# Patient Record
Sex: MALE | Race: WHITE | Employment: OTHER | URBAN - METROPOLITAN AREA
[De-identification: names, ages, dates, MRNs, and addresses within clinical notes are randomized per-mention and may not be internally consistent; named-entity substitution may affect disease eponyms.]

---

## 2018-12-07 ENCOUNTER — HOSPITAL ENCOUNTER (OUTPATIENT)
Dept: MRI IMAGING | Age: 63
Discharge: HOME | End: 2018-12-07
Attending: SPECIALIST
Payer: COMMERCIAL

## 2018-12-07 DIAGNOSIS — M54.12 CERVICAL RADICULOPATHY: ICD-10-CM

## 2018-12-07 PROCEDURE — 72141 MRI NECK SPINE W/O DYE: CPT

## 2018-12-18 ENCOUNTER — TELEPHONE (OUTPATIENT)
Dept: NEUROLOGY | Age: 63
End: 2018-12-18

## 2018-12-18 ENCOUNTER — DOCUMENTATION ONLY (OUTPATIENT)
Dept: NEUROLOGY | Age: 63
End: 2018-12-18

## 2018-12-18 NOTE — TELEPHONE ENCOUNTER
Patient gave me his previous MD info, Dr. Antoinette Lau, [facility] and Dr. Shalonda Orr, with [facility]. Requested records.

## 2018-12-18 NOTE — TELEPHONE ENCOUNTER
----- Message from [employer] sent at 12/18/2018 11:38 AM EST -----  Regarding: Dr. Reddy Mccarty  The patient missed a couple of calls from the office and is requesting a call back.  (m)[phone]

## 2020-06-10 ENCOUNTER — OFFICE VISIT (OUTPATIENT)
Dept: ENDOCRINOLOGY | Age: 65
End: 2020-06-10

## 2020-06-10 VITALS
HEIGHT: 70 IN | BODY MASS INDEX: 30.48 KG/M2 | WEIGHT: 212.9 LBS | DIASTOLIC BLOOD PRESSURE: 84 MMHG | TEMPERATURE: 99.5 F | OXYGEN SATURATION: 98 % | SYSTOLIC BLOOD PRESSURE: 130 MMHG | HEART RATE: 83 BPM | RESPIRATION RATE: 18 BRPM

## 2020-06-10 DIAGNOSIS — M54.12 CERVICAL RADICULOPATHY: Primary | ICD-10-CM

## 2020-06-10 DIAGNOSIS — E11.65 TYPE 2 DIABETES MELLITUS WITH HYPERGLYCEMIA, UNSPECIFIED WHETHER LONG TERM INSULIN USE (HCC): ICD-10-CM

## 2020-06-10 DIAGNOSIS — E11.65 TYPE 2 DIABETES MELLITUS WITH HYPERGLYCEMIA, WITH LONG-TERM CURRENT USE OF INSULIN (HCC): Primary | ICD-10-CM

## 2020-06-10 DIAGNOSIS — Z79.4 TYPE 2 DIABETES MELLITUS WITH HYPERGLYCEMIA, WITH LONG-TERM CURRENT USE OF INSULIN (HCC): Primary | ICD-10-CM

## 2020-06-10 DIAGNOSIS — I10 ESSENTIAL HYPERTENSION: ICD-10-CM

## 2020-06-10 DIAGNOSIS — E78.2 MIXED HYPERLIPIDEMIA: ICD-10-CM

## 2020-06-10 RX ORDER — BISMUTH SUBSALICYLATE 262 MG
1 TABLET,CHEWABLE ORAL DAILY
COMMUNITY
End: 2021-01-12

## 2020-06-10 RX ORDER — ASPIRIN 81 MG/1
81 TABLET ORAL DAILY
COMMUNITY

## 2020-06-10 RX ORDER — INSULIN GLARGINE 300 U/ML
100 INJECTION, SOLUTION SUBCUTANEOUS DAILY
COMMUNITY
End: 2020-07-21 | Stop reason: SDUPTHER

## 2020-06-10 RX ORDER — FLASH GLUCOSE SCANNING READER
EACH MISCELLANEOUS
Qty: 1 EACH | Refills: 0 | Status: SHIPPED | OUTPATIENT
Start: 2020-06-10 | End: 2020-08-05

## 2020-06-10 RX ORDER — FLASH GLUCOSE SENSOR
KIT MISCELLANEOUS
Qty: 6 KIT | Refills: 4 | Status: SHIPPED | OUTPATIENT
Start: 2020-06-10 | End: 2020-08-05

## 2020-06-10 RX ORDER — EVOLOCUMAB 140 MG/ML
140 INJECTION, SOLUTION SUBCUTANEOUS
COMMUNITY
End: 2020-08-13

## 2020-06-10 RX ORDER — DULAGLUTIDE 0.75 MG/.5ML
0.75 INJECTION, SOLUTION SUBCUTANEOUS
Qty: 4 SYRINGE | Refills: 11 | Status: SHIPPED | OUTPATIENT
Start: 2020-06-10 | End: 2021-04-01

## 2020-06-10 RX ORDER — ESCITALOPRAM OXALATE 20 MG/1
20 TABLET ORAL DAILY
COMMUNITY
End: 2020-09-21 | Stop reason: SDUPTHER

## 2020-06-10 RX ORDER — ICOSAPENT ETHYL 1000 MG/1
2 CAPSULE ORAL 2 TIMES DAILY WITH MEALS
COMMUNITY
End: 2020-09-29

## 2020-06-10 NOTE — PROGRESS NOTES
Theo Ascencio MD          Patient Information  Date:6/15/2020  Name : Jimmy Milton 62 y.o.     YOB: 1955         Referred by: Romy Martinez MD         Chief Complaint   Patient presents with    New Patient     referred for DM       History of Present Illness: Jimmy Milton is a 62 y.o. male here for initial visit of  Type 2 Diabetes Mellitus. Type 2 diabetes mellitus was diagnosed in 2010. He is on metformin, Toujeo 100 units at bedtime, was on Humalog before. It was discontinued when he started on Toujeo according to the patient. He was on SGLT2 inhibitors but discontinued due to polyuria. A1c is high, has polyuria, polydipsia  He has tried to eat healthy,  can stick to it for 1 week and after that he goes back to his regular eating habits. \"I have heard about the diet changes 1000 times but I cannot stick to it \"  limited activity, increase hunger  He started checking the blood glucose recently, they are ranging from 200-300  No hypoglycemia  Weight has been fluctuating  No nausea, vomiting, abdominal pain  No chest pain or shortness of breath        Wt Readings from Last 3 Encounters:   06/10/20 212 lb 14.4 oz (96.6 kg)   03/19/12 215 lb (97.5 kg)   01/16/12 212 lb (96.2 kg)       BP Readings from Last 3 Encounters:   06/10/20 130/84   03/19/12 (!) 143/95   01/16/12 122/58           Past Medical History:   Diagnosis Date    Anxiety     Diabetes mellitus     Essential hypertension     Hyperlipidemia     Joint pain     Pancreatitis     Ringing in the ears      Current Outpatient Medications   Medication Sig    insulin glargine U-300 conc (Toujeo Max U-300 SoloStar) 300 unit/mL (3 mL) inpn 100 Units by SubCUTAneous route daily. Toujeo Max    multivitamin (ONE A DAY) tablet Take 1 Tab by mouth daily.  evolocumab (Repatha SureClick) pen injection 070 mg by SubCUTAneous route every fourteen (14) days.     aspirin delayed-release 81 mg tablet Take 81 mg by mouth daily.  escitalopram oxalate (Lexapro) 20 mg tablet Take 20 mg by mouth daily.  icosapent ethyL (Vascepa) 1 gram capsule Take 2 Caps by mouth two (2) times daily (with meals).  metFORMIN (GLUCOPHAGE) 500 mg tablet Take 1,000 mg by mouth two (2) times daily (with meals).  lisinopril (PRINIVIL, ZESTRIL) 20 mg tablet Take 20 mg by mouth daily.  dulaglutide (Trulicity) 9.54 KV/7.8 mL sub-q pen 0.5 mL by SubCUTAneous route every seven (7) days.  flash glucose sensor (Trunk ArchiveStyle Ayana 14 Day Sensor) kit Use as directed every 14 days Dx Code: E11.65    flash glucose scanning reader (FreeStyle Ayana 14 Day [place]) misc Use as directed daily Dx Code: E11.65    chlorpheniramine-HYDROcodone (TUSSIONEX PENNKINETIC ER) 8-10 mg/5 mL suspension Take 5 mL by mouth every twelve (12) hours as needed for Cough.  albuterol (PROVENTIL, VENTOLIN) 90 mcg/actuation inhaler Take 1-2 Puffs by inhalation every four (4) hours as needed for Wheezing.  zolpidem (AMBIEN) 10 mg tablet Take 1 Tab by mouth nightly as needed for Sleep.  fenofibrate nanocrystallized (TRICOR) 145 mg tablet Take  by mouth daily. No current facility-administered medications for this visit. Allergies   Allergen Reactions    Levaquin [Levofloxacin] Rash and Nausea and Vomiting    Neuromuscular Blockers, Steroidal Other (comments)     Heart palpitations on steroids       Review of Systems:  All 10 systems reviewed and are negative other than mentioned in HPI    Physical Examination:   Blood pressure 130/84, pulse 83, temperature 99.5 °F (37.5 °C), temperature source Oral, resp. rate 18, height 5' 10\" (1.778 m), weight 212 lb 14.4 oz (96.6 kg), SpO2 98 %. Estimated body mass index is 30.55 kg/m² as calculated from the following:    Height as of this encounter: 5' 10\" (1.778 m). -   Weight as of this encounter: 212 lb 14.4 oz (96.6 kg).   - General: pleasant, no distress, good eye contact  - HEENT: no pallor, no periorbital edema, EOMI  - Neck: supple, no thyromegaly, no nodules  - Cardiovascular: regular,  normal S1 and S2  - Respiratory: clear to auscultation bilaterally  - Gastrointestinal: soft, nontender, nondistended,  BS +  - Musculoskeletal: no proximal muscle weakness in upper or lower extremities  - Integumentary: no acanthosis nigricans,no edema,   - Neurological: alert and oriented  - Psychiatric: normal mood and affect  - Skin: color, texture, turgor normal.         Data Reviewed:       Lab Results   Component Value Date/Time    Glucose 196 (H) 03/19/2012 07:40 AM    Glucose (POC) 212 (H) 07/12/2010 07:29 PM    Glucose (POC) 211 (H) 07/12/2010 03:38 PM    Creatinine (POC) 0.6 07/12/2010 07:29 PM    Creatinine 1.0 03/19/2012 07:40 AM      Lab Results   Component Value Date/Time    GFR est non-AA >60 03/19/2012 07:40 AM    GFRNA, POC >60 07/12/2010 07:29 PM    GFR est AA >60 03/19/2012 07:40 AM    GFRAA, POC >60 07/12/2010 07:29 PM    Creatinine 1.0 03/19/2012 07:40 AM    Creatinine (POC) 0.6 07/12/2010 07:29 PM    BUN 21 (H) 03/19/2012 07:40 AM    BUN (POC) 19 07/12/2010 07:29 PM    Sodium 132 (L) 03/19/2012 07:40 AM    Sodium (POC) 130 (L) 07/12/2010 07:29 PM    Potassium 4.2 03/19/2012 07:40 AM    Potassium (POC) 3.8 07/12/2010 07:29 PM    Chloride 99 03/19/2012 07:40 AM    Chloride (POC) 103 07/12/2010 07:29 PM    CO2 23 03/19/2012 07:40 AM       Assessment/Plan:     1. Type 2 diabetes mellitus with hyperglycemia, with long-term current use of insulin ([address]    2. Essential hypertension    3. Mixed hyperlipidemia        1. Type 2 Diabetes Mellitus   No results found for: HBA1C, HGBE8, SCJ5ZGTX, EUW5RGJC, MTL0STLI  Discussed the pathophysiology of type 2 diabetes mellitus, discussed that without lifestyle changes, behavioral modification it is difficult to get good glycemic control.   Medications can help to some extent  We have to start with small changes  in his case, need regular counseling  He is already on a higher basal rate  Trulicity  To call with the blood glucose in 1 or 2 weeks    Addendum: After patient left the office got the detailed history from the patient records, there is a mention of pancreatitis, if he has history of pancreatitis which will confirm, given small risk of pancreatitis need to discontinue GLP-1 agonist.  We tried contacting patient multiple times, he sent a message through the portal saying that Trulicity is working well. However I still have to discuss with him about the risks. Called and left a message on Alexandra 15, 2020. Diabetic issues reviewed : glycemic goals , written exchange diet given, low carbohydrate diet, weight control , home glucose monitoring emphasized,  hypoglycemia management and long term diabetic complications discussed. FLU annually ,Pneumovax ,aspirin daily,annual eye exam,microalbumin    2. HTN : Continue current therapy     3. Hyperlipidemia : Continue statin. 4.Obesity:Body mass index is 30.55 kg/m². Discussed about the importance of exercise and carbohydrate portion control. Addendum:  Discussed with the patient that there are no studies in patients with pancreatitis, risk of pancreatitis with Trulicity and he may have another attack which can cause more problems, since it is helping him he would like to continue. Pancreatitis was due to hypertriglyceridemia. He was asked to discontinue at the first sign of nausea or vomiting or abdominal pain. Patient Instructions   Fabian Rubio - account sharing is [employer]        Upload the readings in 2 weeks and we will decide about Humalog     Continue Toujeo     Follow-up and Dispositions    · Return in about 6 weeks (around 7/22/2020). Thank you for allowing me to participate in the care of this patient.     Keena Baez MD      Patient verbalized understanding     Voice-recognition software was used to generate this report, which may result in some phonetic-based errors in the grammar and contents. Even though attempts were made to correct all the mistakes, some may have been missed and remained in the body of the report.

## 2020-06-10 NOTE — PROGRESS NOTES
Zhanna Hernandez is a 62 y.o. male here for   Chief Complaint   Patient presents with    New Patient     referred for DM       1. Have you been to the ER, urgent care clinic since your last visit? Hospitalized since your last visit? -n/a    2. Have you seen or consulted any other health care providers outside of the [address] [place] [address] since your last visit?   Include any pap smears or colon screening.-n/a

## 2020-06-10 NOTE — LETTER
6/15/20 Patient: Niall Garrido YOB: 1955 Date of Visit: 6/10/2020 Mal Graham MD 
[facility] [id number] [facility] [address] VIA In Basket Dear Mal Graham MD, Thank you for referring Mr. Narcisa Gonzales to [address] [address] for evaluation. My notes for this consultation are attached. If you have questions, please do not hesitate to call me. I look forward to following your patient along with you. Sincerely, Herman Mcclure MD

## 2020-06-10 NOTE — PATIENT INSTRUCTIONS
Freestyle alirio - account sharing is [employer]        Upload the readings in 2 weeks and we will decide about Humalog     Continue Toujeo

## 2020-06-11 ENCOUNTER — TELEPHONE (OUTPATIENT)
Dept: ENDOCRINOLOGY | Age: 65
End: 2020-06-11

## 2020-06-16 ENCOUNTER — TELEPHONE (OUTPATIENT)
Dept: ENDOCRINOLOGY | Age: 65
End: 2020-06-16

## 2020-06-19 RX ORDER — INSULIN LISPRO 100 [IU]/ML
INJECTION, SOLUTION INTRAVENOUS; SUBCUTANEOUS
Qty: 90 ML | Refills: 3 | Status: SHIPPED | OUTPATIENT
Start: 2020-06-19 | End: 2020-07-20 | Stop reason: SDUPTHER

## 2020-06-23 ENCOUNTER — TELEPHONE (OUTPATIENT)
Dept: ENDOCRINOLOGY | Age: 65
End: 2020-06-23

## 2020-07-16 ENCOUNTER — TELEPHONE (OUTPATIENT)
Dept: ENDOCRINOLOGY | Age: 65
End: 2020-07-16

## 2020-07-16 NOTE — TELEPHONE ENCOUNTER
Informed pt of message below per physician:    \"Skip Trulicity this week, monitor the blood glucose. If he continues to have stomach pains to discuss with PCP. Usually pancreas pain is below the rib cage. If the pain improves he can start Trulicity the following week and monitor\"    Pt verbalized understanding.

## 2020-07-16 NOTE — TELEPHONE ENCOUNTER
Pt states he is having sharp pains about 3 in below his belly button last night and this morning. He is not sure if it is gas. He states he took his last Trulicity injection last Thursday. He is concerned about taking it today due to the pain. Please advise.

## 2020-07-16 NOTE — TELEPHONE ENCOUNTER
Skip Trulicity this week, monitor the blood glucose. If he continues to have stomach pains to discuss with PCP. Usually pancreas pain is below the rib cage.     If the pain improves he can start Trulicity the following week and monitor

## 2020-07-17 PROBLEM — E11.65 TYPE 2 DIABETES MELLITUS WITH HYPERGLYCEMIA, WITH LONG-TERM CURRENT USE OF INSULIN (HCC): Status: ACTIVE | Noted: 2020-07-17

## 2020-07-17 PROBLEM — Z79.4 TYPE 2 DIABETES MELLITUS WITH HYPERGLYCEMIA, WITH LONG-TERM CURRENT USE OF INSULIN (HCC): Status: ACTIVE | Noted: 2020-07-17

## 2020-07-20 RX ORDER — INSULIN LISPRO 100 [IU]/ML
INJECTION, SOLUTION INTRAVENOUS; SUBCUTANEOUS
Qty: 90 ML | Refills: 3 | Status: SHIPPED | OUTPATIENT
Start: 2020-07-20

## 2020-07-21 DIAGNOSIS — E11.65 TYPE 2 DIABETES MELLITUS WITH HYPERGLYCEMIA, UNSPECIFIED WHETHER LONG TERM INSULIN USE (HCC): Primary | ICD-10-CM

## 2020-07-21 RX ORDER — INSULIN GLARGINE 300 U/ML
100 INJECTION, SOLUTION SUBCUTANEOUS DAILY
Qty: 31.5 ML | Refills: 3 | Status: SHIPPED | OUTPATIENT
Start: 2020-07-21

## 2020-07-28 VITALS
OXYGEN SATURATION: 98 % | DIASTOLIC BLOOD PRESSURE: 80 MMHG | SYSTOLIC BLOOD PRESSURE: 154 MMHG | TEMPERATURE: 97.5 F | BODY MASS INDEX: 31.92 KG/M2 | HEART RATE: 74 BPM | WEIGHT: 223 LBS | HEIGHT: 70 IN

## 2020-07-28 PROBLEM — E78.5 HYPERLIPIDEMIA: Status: ACTIVE | Noted: 2020-07-28

## 2020-07-28 PROBLEM — K85.90 ACUTE PANCREATITIS: Status: ACTIVE | Noted: 2020-07-28

## 2020-07-28 PROBLEM — I10 ESSENTIAL HYPERTENSION: Status: ACTIVE | Noted: 2020-07-28

## 2020-07-28 PROBLEM — K52.9 CHRONIC DIARRHEA: Status: ACTIVE | Noted: 2020-07-28

## 2020-07-28 PROBLEM — F41.1 GENERALIZED ANXIETY DISORDER: Status: ACTIVE | Noted: 2020-07-28

## 2020-07-28 PROBLEM — F17.210 TOBACCO DEPENDENCE DUE TO CIGARETTES: Status: ACTIVE | Noted: 2020-07-28

## 2020-07-28 RX ORDER — ROSUVASTATIN CALCIUM 40 MG/1
40 TABLET, COATED ORAL
COMMUNITY
End: 2020-08-21 | Stop reason: ALTCHOICE

## 2020-07-28 RX ORDER — ONDANSETRON 4 MG/1
4 TABLET, ORALLY DISINTEGRATING ORAL
COMMUNITY
End: 2020-10-12

## 2020-07-28 RX ORDER — BLOOD SUGAR DIAGNOSTIC
STRIP MISCELLANEOUS SEE ADMIN INSTRUCTIONS
COMMUNITY

## 2020-07-28 RX ORDER — PEN NEEDLE, DIABETIC 31 GX3/16"
NEEDLE, DISPOSABLE MISCELLANEOUS
COMMUNITY

## 2020-07-28 RX ORDER — OXYCODONE AND ACETAMINOPHEN 5; 325 MG/1; MG/1
TABLET ORAL
COMMUNITY
End: 2021-01-12

## 2020-07-28 RX ORDER — PEN NEEDLE, DIABETIC 31 GX3/16"
NEEDLE, DISPOSABLE MISCELLANEOUS
COMMUNITY
End: 2020-10-19 | Stop reason: SDUPTHER

## 2020-07-28 RX ORDER — DICYCLOMINE HYDROCHLORIDE 10 MG/1
10 CAPSULE ORAL
COMMUNITY
End: 2021-01-12

## 2020-08-05 ENCOUNTER — OFFICE VISIT (OUTPATIENT)
Dept: ENDOCRINOLOGY | Age: 65
End: 2020-08-05
Payer: COMMERCIAL

## 2020-08-05 VITALS
SYSTOLIC BLOOD PRESSURE: 120 MMHG | DIASTOLIC BLOOD PRESSURE: 78 MMHG | WEIGHT: 221 LBS | HEART RATE: 100 BPM | RESPIRATION RATE: 20 BRPM | TEMPERATURE: 97.9 F | HEIGHT: 70 IN | BODY MASS INDEX: 31.64 KG/M2 | OXYGEN SATURATION: 98 %

## 2020-08-05 DIAGNOSIS — K21.9 GASTROESOPHAGEAL REFLUX DISEASE WITHOUT ESOPHAGITIS: ICD-10-CM

## 2020-08-05 DIAGNOSIS — E11.65 TYPE 2 DIABETES MELLITUS WITH HYPERGLYCEMIA, WITH LONG-TERM CURRENT USE OF INSULIN (HCC): Primary | ICD-10-CM

## 2020-08-05 DIAGNOSIS — I10 ESSENTIAL HYPERTENSION: ICD-10-CM

## 2020-08-05 DIAGNOSIS — E78.2 MIXED HYPERLIPIDEMIA: ICD-10-CM

## 2020-08-05 DIAGNOSIS — E11.65 TYPE 2 DIABETES MELLITUS WITH HYPERGLYCEMIA, WITH LONG-TERM CURRENT USE OF INSULIN (HCC): ICD-10-CM

## 2020-08-05 DIAGNOSIS — Z79.4 TYPE 2 DIABETES MELLITUS WITH HYPERGLYCEMIA, WITH LONG-TERM CURRENT USE OF INSULIN (HCC): ICD-10-CM

## 2020-08-05 DIAGNOSIS — Z79.4 TYPE 2 DIABETES MELLITUS WITH HYPERGLYCEMIA, WITH LONG-TERM CURRENT USE OF INSULIN (HCC): Primary | ICD-10-CM

## 2020-08-05 PROCEDURE — 95251 CONT GLUC MNTR ANALYSIS I&R: CPT | Performed by: INTERNAL MEDICINE

## 2020-08-05 PROCEDURE — 99215 OFFICE O/P EST HI 40 MIN: CPT | Performed by: INTERNAL MEDICINE

## 2020-08-05 PROCEDURE — 3052F HG A1C>EQUAL 8.0%<EQUAL 9.0%: CPT | Performed by: INTERNAL MEDICINE

## 2020-08-05 NOTE — PROGRESS NOTES
Tripp Stone MD          Patient Information  Date:8/6/2020  Name : Sangeeta Youngblood 62 y.o.     YOB: 1955         Referred by: Emiliano Gallegos MD         Chief Complaint   Patient presents with    Diabetes       History of Present Illness: Sangeeta Youngblood is a 62 y.o. male here for follow-up of  Type 2 Diabetes Mellitus. Type 2 diabetes mellitus was diagnosed in 2010. He is on metformin, Toujeo 100 units at bedtime, and Humalog. He was on SGLT2 inhibitors but discontinued due to polyuria. He is on Trulicity low-dose, tolerating very well, blood glucose has improved  Nausea which was there even before starting Trulicity, and seen blackish green stools, was on a course of antibiotics for the same, no change and continues to have nausea. Scheduled to see GI    He has tried to eat healthy,  can stick to it for 1 week and after that he goes back to his regular eating habits.   \"I have heard about the diet changes 1000 times but I cannot stick to it \"  limited activity, increase hunger  Has Dexcom G6  Few episodes of hypoglycemia due to increased activity in the yard          Wt Readings from Last 3 Encounters:   08/05/20 221 lb (100.2 kg)   07/28/20 223 lb (101.2 kg)   06/10/20 212 lb 14.4 oz (96.6 kg)       BP Readings from Last 3 Encounters:   08/05/20 120/78   07/28/20 154/80   06/10/20 130/84           Past Medical History:   Diagnosis Date    Acute pancreatitis 7/28/2020    Anxiety     Chronic diarrhea 7/28/2020    Diabetes mellitus     Essential hypertension     Essential hypertension 7/28/2020    Generalized anxiety disorder 7/28/2020    History of arthroscopic surgery of elbow     Hyperlipidemia     Hyperlipidemia 7/28/2020    Joint pain     Pancreatitis     Ringing in the ears     Tobacco dependence due to cigarettes 7/28/2020    Uncontrolled type 2 diabetes mellitus ([address] 7/28/2020     Current Outpatient Medications Medication Sig    Insulin Needles, Disposable, (BD Ultra-Fine Mini Pen Needle) 31 gauge x 3/16\" ndle by Does Not Apply route.  Insulin Needles, Disposable, (Felipa Pen Needle) 32 gauge x 5/32\" ndle by Does Not Apply route.  dicyclomine (BENTYL) 10 mg capsule Take 10 mg by mouth 4 times daily (before meals and nightly).  glucose blood VI test strips (OneTouch Verio test strips) strip by Does Not Apply route See Admin Instructions.  oxyCODONE-acetaminophen (PERCOCET) 5-325 mg per tablet Take  by mouth every four (4) hours as needed for Pain.  insulin glargine U-300 conc (Toujeo Max U-300 SoloStar) 300 unit/mL (3 mL) inpn 100 Units by SubCUTAneous route daily. Toujeo Max    insulin lispro (HUMALOG) 100 unit/mL kwikpen 15 - 30 units before meals    multivitamin (ONE A DAY) tablet Take 1 Tab by mouth daily.  evolocumab (Repatha SureClick) pen injection 797 mg by SubCUTAneous route every fourteen (14) days.  aspirin delayed-release 81 mg tablet Take 81 mg by mouth daily.  escitalopram oxalate (Lexapro) 20 mg tablet Take 20 mg by mouth daily.  icosapent ethyL (Vascepa) 1 gram capsule Take 2 Caps by mouth two (2) times daily (with meals).  dulaglutide (Trulicity) 0.37 PM/7.6 mL sub-q pen 0.5 mL by SubCUTAneous route every seven (7) days.  metFORMIN (GLUCOPHAGE) 500 mg tablet Take 1,000 mg by mouth two (2) times daily (with meals).  lisinopril (PRINIVIL, ZESTRIL) 20 mg tablet Take 20 mg by mouth daily.  ondansetron (ZOFRAN ODT) 4 mg disintegrating tablet Take 4 mg by mouth every eight (8) hours as needed for Nausea or Vomiting.  rosuvastatin (CRESTOR) 40 mg tablet Take 40 mg by mouth nightly.  chlorpheniramine-HYDROcodone (TUSSIONEX PENNKINETIC ER) 8-10 mg/5 mL suspension Take 5 mL by mouth every twelve (12) hours as needed for Cough.  albuterol (PROVENTIL, VENTOLIN) 90 mcg/actuation inhaler Take 1-2 Puffs by inhalation every four (4) hours as needed for Wheezing.      No current facility-administered medications for this visit. Allergies   Allergen Reactions    Levaquin [Levofloxacin] Rash and Nausea and Vomiting    Neuromuscular Blockers, Steroidal Other (comments)     Heart palpitations on steroids       Review of Systems:  All 10 systems reviewed and are negative other than mentioned in HPI    Physical Examination:   Blood pressure 120/78, pulse 100, temperature 97.9 °F (36.6 °C), temperature source Oral, resp. rate 20, height 5' 10\" (1.778 m), weight 221 lb (100.2 kg), SpO2 98 %. Estimated body mass index is 31.71 kg/m² as calculated from the following:    Height as of this encounter: 5' 10\" (1.778 m). -   Weight as of this encounter: 221 lb (100.2 kg).   - General: pleasant, no distress, good eye contact  - HEENT: no pallor, no periorbital edema, EOMI  - Neck: supple,  - Cardiovascular: regular,  normal S1 and S2  - Respiratory: clear to auscultation bilaterally  - Gastrointestinal: soft, nontender, nondistended,  BS +  - Musculoskeletal: no proximal muscle weakness in upper or lower extremities  -   - Neurological: alert and oriented  - Psychiatric: normal mood and affect  - Skin: color, texture, turgor normal.         Data Reviewed:       Lab Results   Component Value Date/Time    Hemoglobin A1c, External 8.6 03/12/2020    Glucose 196 (H) 03/19/2012 07:40 AM    Glucose (POC) 212 (H) 07/12/2010 07:29 PM    Glucose (POC) 211 (H) 07/12/2010 03:38 PM    Creatinine (POC) 0.6 07/12/2010 07:29 PM    Creatinine 1.0 03/19/2012 07:40 AM      Lab Results   Component Value Date/Time    GFR est non-AA >60 03/19/2012 07:40 AM    GFRNA, POC >60 07/12/2010 07:29 PM    GFR est AA >60 03/19/2012 07:40 AM    GFRAA, POC >60 07/12/2010 07:29 PM    Creatinine 1.0 03/19/2012 07:40 AM    Creatinine (POC) 0.6 07/12/2010 07:29 PM    BUN 21 (H) 03/19/2012 07:40 AM    BUN (POC) 19 07/12/2010 07:29 PM    Sodium 132 (L) 03/19/2012 07:40 AM    Sodium (POC) 130 (L) 07/12/2010 07:29 PM    Potassium 4.2 03/19/2012 07:40 AM    Potassium (POC) 3.8 07/12/2010 07:29 PM    Chloride 99 03/19/2012 07:40 AM    Chloride (POC) 103 07/12/2010 07:29 PM    CO2 23 03/19/2012 07:40 AM       Assessment/Plan:     1. Type 2 diabetes mellitus with hyperglycemia, with long-term current use of insulin ([address]    2. Essential hypertension    3. Mixed hyperlipidemia    4. Gastroesophageal reflux disease without esophagitis        1. Type 2 Diabetes Mellitus   Lab Results   Component Value Date/Time    Hemoglobin A1c, External 8.6 03/12/2020     Stressed the importance of lifestyle changes, behavioral modification. We have to start with small Toujeo 163 units  Trulicity  The amount of Humalog requirement has decreased    Continuous glucose monitor data downloaded for 2 weeks and reviewed with the patient  Noted post dinner hyperglycemia  Post lunch hypoglycemia  To decrease the amount of Humalog when he plans to increase activity. Discussed with the patient that there are no studies in patients with pancreatitis, increased risk of pancreatitis with Trulicity and he may have another attack which can cause more problems, since it is helping him he would like to continue. Pancreatitis was due to hypertriglyceridemia. He was asked to discontinue at the first sign of nausea or vomiting or abdominal pain. High risk for hypoglycemia given higher dose of insulin usage    Diabetic issues reviewed : glycemic goals , written exchange diet given, low carbohydrate diet, weight control , home glucose monitoring emphasized,  hypoglycemia management and long term diabetic complications discussed. FLU annually ,Pneumovax ,aspirin daily,annual eye exam,microalbumin    2. HTN : Continue current therapy     3. Hyperlipidemia : Continue statin. 4.Obesity:Body mass index is 31.71 kg/m². Discussed about the importance of exercise and carbohydrate portion control. There are no Patient Instructions on file for this visit.   Follow-up and Dispositions    · Return in about 3 months (around 11/5/2020) for labs before next visit and follow up. Thank you for allowing me to participate in the care of this patient. Beronica Pelaez MD      Patient verbalized understanding     Voice-recognition software was used to generate this report, which may result in some phonetic-based errors in the grammar and contents. Even though attempts were made to correct all the mistakes, some may have been missed and remained in the body of the report.

## 2020-08-05 NOTE — PROGRESS NOTES
Suresh Whitley is a 62 y.o. male here for   Chief Complaint   Patient presents with    Diabetes       1. Have you been to the ER, urgent care clinic since your last visit? Hospitalized since your last visit? -no    2. Have you seen or consulted any other health care providers outside of the [address] [place] [address] since your last visit?   Include any pap smears or colon screening.-no

## 2020-08-05 NOTE — LETTER
8/6/20 Patient: Osorio Auguste YOB: 1955 Date of Visit: 8/5/2020 Evangelina Pyle MD 
[facility] [id number] [place] [address] VIA In Basket Dear Evangelina Pyle MD, Thank you for referring Mr. Eusebio Feldman to [id number] [place] [address] for evaluation. My notes for this consultation are attached. If you have questions, please do not hesitate to call me. I look forward to following your patient along with you. Sincerely, Yannick Raymundo MD

## 2020-08-11 DIAGNOSIS — I25.10 CORONARY ARTERY DISEASE INVOLVING NATIVE CORONARY ARTERY OF NATIVE HEART, ANGINA PRESENCE UNSPECIFIED: Primary | ICD-10-CM

## 2020-08-13 ENCOUNTER — TELEPHONE (OUTPATIENT)
Dept: PRIMARY CARE CLINIC | Age: 65
End: 2020-08-13

## 2020-08-13 DIAGNOSIS — E78.2 MIXED HYPERLIPIDEMIA: Primary | ICD-10-CM

## 2020-08-13 RX ORDER — EVOLOCUMAB 140 MG/ML
INJECTION, SOLUTION SUBCUTANEOUS
Qty: 2 PEN | Refills: 1 | Status: SHIPPED | OUTPATIENT
Start: 2020-08-13 | End: 2020-08-21 | Stop reason: SDUPTHER

## 2020-08-13 NOTE — TELEPHONE ENCOUNTER
Monserrat Cassa MD 1 hour ago (3:51 PM)          I can't seem to get into see My regular doctor they're having all kinds of issues at their practice I was hoping that I could get y'all to help me get my [address], [place] RICARDO 1 hour ago (3:12 PM)          Don't you see endo? Shouldn't you be getting this from you endo doctor?             Tigist Buenrostro CMA routed conversation to [place] 8 hours ago (8:52 AM)       Monserrat Casas MD Yesterday (1:51 PM)          I need my rapatha prescription sent to [facility] pharmacy for refill ASAP please

## 2020-08-13 NOTE — TELEPHONE ENCOUNTER
Filled Repatha SureClick 102 mg/mL subcutaneous pen injector    look the he gets two   [facility] [address]

## 2020-08-14 NOTE — TELEPHONE ENCOUNTER
RX  X 2 months worth sent.    He ill need to contact cardio for addl refills ( this is a cholesterol lowering injectable)

## 2020-08-21 ENCOUNTER — VIRTUAL VISIT (OUTPATIENT)
Dept: PRIMARY CARE CLINIC | Age: 65
End: 2020-08-21
Payer: COMMERCIAL

## 2020-08-21 DIAGNOSIS — R06.09 DYSPNEA ON EXERTION: Chronic | ICD-10-CM

## 2020-08-21 DIAGNOSIS — I25.10 CORONARY ARTERY DISEASE INVOLVING NATIVE CORONARY ARTERY OF NATIVE HEART, ANGINA PRESENCE UNSPECIFIED: ICD-10-CM

## 2020-08-21 DIAGNOSIS — I10 ESSENTIAL HYPERTENSION: Primary | ICD-10-CM

## 2020-08-21 DIAGNOSIS — Z79.4 TYPE 2 DIABETES MELLITUS WITH HYPERGLYCEMIA, WITH LONG-TERM CURRENT USE OF INSULIN (HCC): Chronic | ICD-10-CM

## 2020-08-21 DIAGNOSIS — E11.65 TYPE 2 DIABETES MELLITUS WITH HYPERGLYCEMIA, WITH LONG-TERM CURRENT USE OF INSULIN (HCC): Chronic | ICD-10-CM

## 2020-08-21 DIAGNOSIS — L40.8 PSORIASIFORM SEBORRHEIC DERMATITIS: Chronic | ICD-10-CM

## 2020-08-21 DIAGNOSIS — E78.2 MIXED HYPERLIPIDEMIA: ICD-10-CM

## 2020-08-21 PROCEDURE — 99213 OFFICE O/P EST LOW 20 MIN: CPT | Performed by: FAMILY MEDICINE

## 2020-08-21 RX ORDER — BETAMETHASONE DIPROPIONATE 0.5 MG/G
CREAM TOPICAL 2 TIMES DAILY
Qty: 45 G | Refills: 1 | Status: SHIPPED | OUTPATIENT
Start: 2020-08-21

## 2020-08-21 RX ORDER — EVOLOCUMAB 140 MG/ML
INJECTION, SOLUTION SUBCUTANEOUS
Qty: 6 PEN | Refills: 1 | Status: SHIPPED | OUTPATIENT
Start: 2020-08-21 | End: 2021-05-05

## 2020-08-21 NOTE — PROGRESS NOTES
HISTORY OF PRESENT ILLNESS  THIS IS  VIDEO VISIT PERFORMED with the patients permission THRU THE SECURE WEBSITE [employer]. ME.     Diabetes:  Seeing Dr Lisa maharaj g6 and avg 145- 150. Hyperlipidemia:     On repatha  For years. No s/e. Due for labs    Rash on upper chest and face times months, comes and goes. Not responding to moisturizer anymore. Sherryle Easterly is a 62 y.o. male. Past Medical History:   Diagnosis Date    Acute pancreatitis 7/28/2020    Anxiety     Chronic diarrhea 7/28/2020    Diabetes mellitus     Essential hypertension     Essential hypertension 7/28/2020    Generalized anxiety disorder 7/28/2020    History of arthroscopic surgery of elbow     Hyperlipidemia     Hyperlipidemia 7/28/2020    Joint pain     Pancreatitis     Ringing in the ears     Tobacco dependence due to cigarettes 7/28/2020    Uncontrolled type 2 diabetes mellitus ([address] 7/28/2020     Social History     Tobacco Use    Smoking status: Former Smoker    Smokeless tobacco: Never Used   Substance Use Topics    Alcohol use: No    Drug use: Not on file       Family History   Problem Relation Age of Onset    Heart Disease Father     Cancer Father         lung    Hypertension Father     Cancer Mother         colon       Review of Systems   Constitutional: Positive for malaise/fatigue. Negative for chills, diaphoresis, fever and weight loss. HENT: Negative for congestion. Eyes: Negative for blurred vision. Respiratory: Negative for cough and shortness of breath. Cardiovascular: Negative for chest pain, palpitations, orthopnea and leg swelling. Gastrointestinal: Negative for abdominal pain, diarrhea, heartburn and nausea. Musculoskeletal: Negative for myalgias. Skin: Positive for itching and rash. Neurological: Negative for dizziness, tremors, weakness and headaches. Psychiatric/Behavioral: Negative for depression. The patient is not nervous/anxious and does not have insomnia. Physical Exam  Nursing note reviewed. Constitutional:       General: He is not in acute distress. Appearance: Normal appearance. He is obese. He is not ill-appearing. HENT:      Head: Normocephalic and atraumatic. Eyes:      Extraocular Movements: Extraocular movements intact. Pulmonary:      Effort: Pulmonary effort is normal. No respiratory distress. Skin:     Findings: Rash present. Comments: Scaly macules and patches, pink base, on left upper chest upper central chest, and eyebrows, medial face   Neurological:      General: No focal deficit present. Mental Status: He is alert and oriented to person, place, and time. Psychiatric:         Mood and Affect: Mood normal.         Behavior: Behavior normal.         Thought Content: Thought content normal.           ASSESSMENT and PLAN  Diagnoses and all orders for this visit:    1. Essential hypertension  -     METABOLIC PANEL, COMPREHENSIVE  -     CBC WITH AUTOMATED DIFF  -     LIPID PANEL    2. Type 2 diabetes mellitus with hyperglycemia, with long-term current use of insulin (HCC)  Comments:  follwed by endo check a1c  with our labs  Orders:  -     CBC WITH AUTOMATED DIFF  -     HEMOGLOBIN A1C WITH EAG    3. Mixed hyperlipidemia  -     METABOLIC PANEL, COMPREHENSIVE  -     LIPID PANEL    4. Dyspnea on exertion  Comments:  chronic. Orders:  -     CBC WITH AUTOMATED DIFF    5. Coronary artery disease involving native coronary artery of native heart, angina presence unspecified  -     evolocumab (Repatha SureClick) pen injection; INJECT 140 MG UNDER THE SKIN EVERY 14 DAYS  Indications: high cholesterol    6. Psoriasiform seborrheic dermatitis  Comments:   use 1-2 X per week head and shoulders as body wash on affected areas. sit a few minutes then rinse.  use cream those not responding. max of 2 cont.  weeks/use    Other orders  -     betamethasone dipropionate (DIPROSONE) 0.05 % topical cream; Apply  to affected area two (2) times a day. Prn skin rash. Maximum of 14 days continous use. Orders Placed This Encounter    METABOLIC PANEL, COMPREHENSIVE    CBC WITH AUTOMATED DIFF    LIPID PANEL    HEMOGLOBIN A1C WITH EAG    evolocumab (Repatha SureClick) pen injection    betamethasone dipropionate (DIPROSONE) 0.05 % topical cream     Follow-up and Dispositions    · Return in about 6 months (around 2/21/2021) for Chronic office visit.

## 2020-08-28 ENCOUNTER — TELEPHONE (OUTPATIENT)
Dept: ENDOCRINOLOGY | Age: 65
End: 2020-08-28

## 2020-08-28 DIAGNOSIS — E78.2 MIXED HYPERLIPIDEMIA: Primary | ICD-10-CM

## 2020-08-28 LAB
ALBUMIN SERPL-MCNC: 4.4 G/DL (ref 3.8–4.8)
ALBUMIN/GLOB SERPL: 1.7 {RATIO} (ref 1.2–2.2)
ALP SERPL-CCNC: 71 IU/L (ref 39–117)
ALT SERPL-CCNC: 36 IU/L (ref 0–44)
AST SERPL-CCNC: 20 IU/L (ref 0–40)
BASOPHILS # BLD AUTO: 0.1 X10E3/UL (ref 0–0.2)
BASOPHILS NFR BLD AUTO: 1 %
BILIRUB SERPL-MCNC: 0.3 MG/DL (ref 0–1.2)
BUN SERPL-MCNC: 20 MG/DL (ref 8–27)
BUN/CREAT SERPL: 24 (ref 10–24)
CALCIUM SERPL-MCNC: 9.5 MG/DL (ref 8.6–10.2)
CHLORIDE SERPL-SCNC: 98 MMOL/L (ref 96–106)
CHOLEST SERPL-MCNC: 186 MG/DL (ref 100–199)
CO2 SERPL-SCNC: 20 MMOL/L (ref 20–29)
CREAT SERPL-MCNC: 0.85 MG/DL (ref 0.76–1.27)
EOSINOPHIL # BLD AUTO: 0.5 X10E3/UL (ref 0–0.4)
EOSINOPHIL NFR BLD AUTO: 5 %
ERYTHROCYTE [DISTWIDTH] IN BLOOD BY AUTOMATED COUNT: 14.6 % (ref 11.6–15.4)
EST. AVERAGE GLUCOSE BLD GHB EST-MCNC: 160 MG/DL
GLOBULIN SER CALC-MCNC: 2.6 G/DL (ref 1.5–4.5)
GLUCOSE SERPL-MCNC: 150 MG/DL (ref 65–99)
HBA1C MFR BLD: 7.2 % (ref 4.8–5.6)
HCT VFR BLD AUTO: 45.1 % (ref 37.5–51)
HDLC SERPL-MCNC: 47 MG/DL
HGB BLD-MCNC: 14.7 G/DL (ref 13–17.7)
IMM GRANULOCYTES # BLD AUTO: 0.1 X10E3/UL (ref 0–0.1)
IMM GRANULOCYTES NFR BLD AUTO: 1 %
LDLC SERPL CALC-MCNC: ABNORMAL MG/DL (ref 0–99)
LYMPHOCYTES # BLD AUTO: 3.1 X10E3/UL (ref 0.7–3.1)
LYMPHOCYTES NFR BLD AUTO: 35 %
MCH RBC QN AUTO: 27.9 PG (ref 26.6–33)
MCHC RBC AUTO-ENTMCNC: 32.6 G/DL (ref 31.5–35.7)
MCV RBC AUTO: 86 FL (ref 79–97)
MONOCYTES # BLD AUTO: 0.7 X10E3/UL (ref 0.1–0.9)
MONOCYTES NFR BLD AUTO: 8 %
NEUTROPHILS # BLD AUTO: 4.5 X10E3/UL (ref 1.4–7)
NEUTROPHILS NFR BLD AUTO: 50 %
PLATELET # BLD AUTO: 279 X10E3/UL (ref 150–450)
POTASSIUM SERPL-SCNC: 5 MMOL/L (ref 3.5–5.2)
PROT SERPL-MCNC: 7 G/DL (ref 6–8.5)
RBC # BLD AUTO: 5.27 X10E6/UL (ref 4.14–5.8)
SODIUM SERPL-SCNC: 137 MMOL/L (ref 134–144)
TRIGL SERPL-MCNC: 635 MG/DL (ref 0–149)
VLDLC SERPL CALC-MCNC: ABNORMAL MG/DL (ref 5–40)
WBC # BLD AUTO: 8.9 X10E3/UL (ref 3.4–10.8)

## 2020-08-28 RX ORDER — FENOFIBRATE 145 MG/1
145 TABLET, COATED ORAL DAILY
Qty: 90 TAB | Refills: 3 | Status: SHIPPED | OUTPATIENT
Start: 2020-08-28

## 2020-08-28 NOTE — TELEPHONE ENCOUNTER
----- Message from Sukumar Aleman MD sent at 8/28/2020  9:00 AM EDT -----  Fenofribrate 145 mg in AM

## 2020-08-30 NOTE — PROGRESS NOTES
Normal  blood count, normal liver and kidney function  Total cholesterol 186 at [facility] but high Tg at 635 goal under 150. Is he taking his fibrate?   Aic 7.2 goal under 7 (  =  FBS less than 130)

## 2020-09-02 ENCOUNTER — TELEPHONE (OUTPATIENT)
Dept: PRIMARY CARE CLINIC | Age: 65
End: 2020-09-02

## 2020-09-02 NOTE — TELEPHONE ENCOUNTER
I spoke to pt about his lab results::Normal  blood count, normal liver and kidney function  Total cholesterol 186 at [facility] but high Tg at 635 goal under 150.   Is he taking his fibrate?  Aic 7.2 goal under 7 (  =  FBS less than 130)     Pt said he was not taking the Fibrate because it made his stomach hurt. Pt said he is going to try it again.  ASHLEY

## 2020-09-03 NOTE — TELEPHONE ENCOUNTER
Tell him if he cant tolerate it ater 2 weeks stop it . Then add   1)metamucil BID and start  2) flush free niacin 500mg at bedtime.

## 2020-09-18 ENCOUNTER — TELEPHONE (OUTPATIENT)
Dept: PRIMARY CARE CLINIC | Age: 65
End: 2020-09-18

## 2020-09-21 RX ORDER — ESCITALOPRAM OXALATE 20 MG/1
20 TABLET ORAL DAILY
Qty: 90 TAB | Refills: 1 | Status: SHIPPED | OUTPATIENT
Start: 2020-09-21 | End: 2021-03-30 | Stop reason: SDUPTHER

## 2020-10-06 ENCOUNTER — TELEPHONE (OUTPATIENT)
Dept: PRIMARY CARE CLINIC | Age: 65
End: 2020-10-06

## 2020-10-06 NOTE — TELEPHONE ENCOUNTER
Pt states Feeling tired some shortness of breath been using inhaler seems to help some but doesn't last to long. I ask patient if he has any other symptoms and patient states no.  Pt states he doesn't have a rescue inhaler

## 2020-10-06 NOTE — TELEPHONE ENCOUNTER
Do not see a diagnosis on the oximetry but since he has COPD. This is a pulmonologist.  Is a shortness of breath  .   He did have an inhaler refill  In Feb.   Whom is the doctor whom gave that and why?     sounds like he needs an appt for heather

## 2020-10-12 ENCOUNTER — OFFICE VISIT (OUTPATIENT)
Dept: PRIMARY CARE CLINIC | Age: 65
End: 2020-10-12
Payer: COMMERCIAL

## 2020-10-12 VITALS
HEART RATE: 85 BPM | SYSTOLIC BLOOD PRESSURE: 146 MMHG | WEIGHT: 229.2 LBS | HEIGHT: 70 IN | OXYGEN SATURATION: 96 % | TEMPERATURE: 97.3 F | DIASTOLIC BLOOD PRESSURE: 92 MMHG | BODY MASS INDEX: 32.81 KG/M2

## 2020-10-12 DIAGNOSIS — J45.31 MILD PERSISTENT ASTHMA WITH ACUTE EXACERBATION: Primary | Chronic | ICD-10-CM

## 2020-10-12 PROCEDURE — 99213 OFFICE O/P EST LOW 20 MIN: CPT | Performed by: FAMILY MEDICINE

## 2020-10-12 RX ORDER — ALBUTEROL SULFATE 90 UG/1
AEROSOL, METERED RESPIRATORY (INHALATION)
Qty: 1 INHALER | Refills: 2 | Status: SHIPPED | OUTPATIENT
Start: 2020-10-12

## 2020-10-12 RX ORDER — BUDESONIDE AND FORMOTEROL FUMARATE DIHYDRATE 160; 4.5 UG/1; UG/1
2 AEROSOL RESPIRATORY (INHALATION) 2 TIMES DAILY
Qty: 1 INHALER | Refills: 2 | Status: SHIPPED | OUTPATIENT
Start: 2020-10-12 | End: 2021-01-08

## 2020-10-12 RX ORDER — AMOXICILLIN AND CLAVULANATE POTASSIUM 875; 125 MG/1; MG/1
1 TABLET, FILM COATED ORAL 2 TIMES DAILY
Qty: 20 TAB | Refills: 0 | Status: SHIPPED | OUTPATIENT
Start: 2020-10-12 | End: 2020-10-22

## 2020-10-12 RX ORDER — ALBUTEROL SULFATE 90 UG/1
AEROSOL, METERED RESPIRATORY (INHALATION)
COMMUNITY
End: 2020-10-12 | Stop reason: SDUPTHER

## 2020-10-12 NOTE — PROGRESS NOTES
HISTORY OF PRESENT ILLNESS  History of recurrent bronchitis, is not having any i daily inhalers at home. Has not smoked in several years. Short of breath on exertion mainly, but sometimes wheezes with allergies. His inhaler is old. No productive sputum, no fever or chills. No recent illnesses. No known exposure to P.O. Marcie Tao is a 62 y.o. male. Past Medical History:   Diagnosis Date    Acute pancreatitis 7/28/2020    Anxiety     Chronic diarrhea 7/28/2020    Diabetes mellitus     Essential hypertension     Essential hypertension 7/28/2020    Generalized anxiety disorder 7/28/2020    History of arthroscopic surgery of elbow     Hyperlipidemia     Hyperlipidemia 7/28/2020    Joint pain     Pancreatitis     Ringing in the ears     Tobacco dependence due to cigarettes 7/28/2020    Uncontrolled type 2 diabetes mellitus ([address] 7/28/2020     Social History     Tobacco Use    Smoking status: Former Smoker    Smokeless tobacco: Never Used   Substance Use Topics    Alcohol use: No    Drug use: Not on file       Family History   Problem Relation Age of Onset    Heart Disease Father     Cancer Father         lung    Hypertension Father     Cancer Mother         colon       Review of Systems   Constitutional: Negative. Negative for chills, fever, malaise/fatigue and weight loss. HENT: Negative for congestion, ear pain and sore throat. Eyes: Negative for blurred vision. Respiratory: Positive for cough and shortness of breath. Negative for hemoptysis, sputum production and wheezing. Cardiovascular: Negative for chest pain, palpitations, orthopnea and leg swelling. Gastrointestinal: Negative for abdominal pain. Skin: Negative for rash. Neurological: Negative for dizziness, loss of consciousness, weakness and headaches. Endo/Heme/Allergies: Negative for polydipsia. Psychiatric/Behavioral: The patient is not nervous/anxious.              Physical Exam  Vitals signs and nursing note reviewed. Constitutional:       General: He is not in acute distress. Appearance: Normal appearance. He is obese. He is not ill-appearing. HENT:      Head: Normocephalic and atraumatic. Right Ear: Tympanic membrane, ear canal and external ear normal.      Left Ear: Tympanic membrane, ear canal and external ear normal.      Nose: No rhinorrhea. Mouth/Throat:      Mouth: Mucous membranes are moist.      Pharynx: No oropharyngeal exudate or posterior oropharyngeal erythema. Eyes:      General: No scleral icterus. Right eye: No discharge. Left eye: No discharge. Extraocular Movements: Extraocular movements intact. Conjunctiva/sclera: Conjunctivae normal.      Pupils: Pupils are equal, round, and reactive to light. Cardiovascular:      Rate and Rhythm: Normal rate and regular rhythm. Pulses: Normal pulses. Heart sounds: Normal heart sounds. Pulmonary:      Effort: Pulmonary effort is normal. No respiratory distress. Breath sounds: Rhonchi (End expiratory) present. No wheezing or rales. Comments: Normal percussion note  Musculoskeletal:      Right lower leg: No edema. Left lower leg: No edema. Lymphadenopathy:      Cervical: No cervical adenopathy. Skin:     General: Skin is warm. Capillary Refill: Capillary refill takes less than 2 seconds. Coloration: Skin is not jaundiced. Neurological:      General: No focal deficit present. Mental Status: He is alert and oriented to person, place, and time. Mental status is at baseline. Cranial Nerves: No cranial nerve deficit. Deep Tendon Reflexes: Reflexes normal.   Psychiatric:         Mood and Affect: Mood normal.         Behavior: Behavior normal.         Thought Content: Thought content normal.         Judgment: Judgment normal.           ASSESSMENT and PLAN  Diagnoses and all orders for this visit:    1.  Mild persistent asthma with acute exacerbation  Comments:  Start Symbicort daily. Rinse mouth after use. As needed albuterol . Will need spirometry at some point. Former smoker so there may be some COPD component    Other orders  -     albuterol (PROVENTIL HFA, VENTOLIN HFA, PROAIR HFA) 90 mcg/actuation inhaler; 1-2 puffs inhalation every 4- 6hrs as needed  -     amoxicillin-clavulanate (AUGMENTIN) 875-125 mg per tablet; Take 1 Tab by mouth two (2) times a day for 10 days. Indications: bacterial infection with chronic bronchitis  -     budesonide-formoteroL (SYMBICORT) 160-4.5 mcg/actuation HFAA; Take 2 Puffs by inhalation two (2) times a day.  Indications: controller medication for asthma         Orders Placed This Encounter    DISCONTD: albuterol (PROVENTIL HFA, VENTOLIN HFA, PROAIR HFA) 90 mcg/actuation inhaler    albuterol (PROVENTIL HFA, VENTOLIN HFA, PROAIR HFA) 90 mcg/actuation inhaler    amoxicillin-clavulanate (AUGMENTIN) 875-125 mg per tablet    budesonide-formoteroL (SYMBICORT) 160-4.5 mcg/actuation HFAA

## 2020-10-19 DIAGNOSIS — Z79.4 TYPE 2 DIABETES MELLITUS WITH HYPERGLYCEMIA, WITH LONG-TERM CURRENT USE OF INSULIN (HCC): Primary | ICD-10-CM

## 2020-10-19 DIAGNOSIS — E11.65 TYPE 2 DIABETES MELLITUS WITH HYPERGLYCEMIA, WITH LONG-TERM CURRENT USE OF INSULIN (HCC): Primary | ICD-10-CM

## 2020-10-19 RX ORDER — PEN NEEDLE, DIABETIC 31 GX3/16"
NEEDLE, DISPOSABLE MISCELLANEOUS
Qty: 400 PEN NEEDLE | Refills: 3 | Status: SHIPPED | OUTPATIENT
Start: 2020-10-19

## 2020-11-05 DIAGNOSIS — E78.2 MIXED HYPERLIPIDEMIA: ICD-10-CM

## 2020-11-05 DIAGNOSIS — Z79.4 TYPE 2 DIABETES MELLITUS WITH HYPERGLYCEMIA, WITH LONG-TERM CURRENT USE OF INSULIN (HCC): ICD-10-CM

## 2020-11-05 DIAGNOSIS — E11.65 TYPE 2 DIABETES MELLITUS WITH HYPERGLYCEMIA, WITH LONG-TERM CURRENT USE OF INSULIN (HCC): ICD-10-CM

## 2020-11-05 DIAGNOSIS — I10 ESSENTIAL HYPERTENSION: ICD-10-CM

## 2020-12-15 PROBLEM — J45.31 MILD PERSISTENT ASTHMA WITH ACUTE EXACERBATION: Chronic | Status: ACTIVE | Noted: 2020-12-15

## 2021-01-12 ENCOUNTER — OFFICE VISIT (OUTPATIENT)
Dept: ENDOCRINOLOGY | Age: 66
End: 2021-01-12
Payer: COMMERCIAL

## 2021-01-12 VITALS
SYSTOLIC BLOOD PRESSURE: 122 MMHG | DIASTOLIC BLOOD PRESSURE: 72 MMHG | TEMPERATURE: 97.3 F | WEIGHT: 222 LBS | BODY MASS INDEX: 31.78 KG/M2 | HEIGHT: 70 IN | HEART RATE: 93 BPM | OXYGEN SATURATION: 97 % | RESPIRATION RATE: 16 BRPM

## 2021-01-12 DIAGNOSIS — I10 ESSENTIAL HYPERTENSION: ICD-10-CM

## 2021-01-12 DIAGNOSIS — E11.65 TYPE 2 DIABETES MELLITUS WITH HYPERGLYCEMIA, WITH LONG-TERM CURRENT USE OF INSULIN (HCC): Primary | ICD-10-CM

## 2021-01-12 DIAGNOSIS — E78.2 MIXED HYPERLIPIDEMIA: ICD-10-CM

## 2021-01-12 DIAGNOSIS — Z79.4 TYPE 2 DIABETES MELLITUS WITH HYPERGLYCEMIA, WITH LONG-TERM CURRENT USE OF INSULIN (HCC): Primary | ICD-10-CM

## 2021-01-12 PROCEDURE — 99214 OFFICE O/P EST MOD 30 MIN: CPT | Performed by: INTERNAL MEDICINE

## 2021-01-12 RX ORDER — METFORMIN HYDROCHLORIDE 750 MG/1
750 TABLET, EXTENDED RELEASE ORAL 2 TIMES DAILY
Qty: 180 TAB | Refills: 3 | Status: SHIPPED | OUTPATIENT
Start: 2021-01-12 | End: 2021-05-12 | Stop reason: SDUPTHER

## 2021-01-12 NOTE — PROGRESS NOTES
Jayy Ayon MD          Patient Information  Date:1/12/2021  Name : Elizabeth Bear 60 y.o.     YOB: 1955         Referred by: Dian Zacarias MD         Chief Complaint   Patient presents with    Diabetes       History of Present Illness: Elizabeth Bear is a 60 y.o. male here for follow-up of  Type 2 Diabetes Mellitus. Type 2 diabetes mellitus was diagnosed in 2010. On MDI, Jay  He has not been taking insulin consistently as he was overwhelmed, not able to concentrate on the diet also. He was doing very well until last visit  No depression    He also does not have Dexcom due to high deductible  Blood glucose have been running in 300s  He was on SGLT2 inhibitors but discontinued due to polyuria. He is on low-dose Trulicity    Had diarrhea, work-up negative, after holding Metformin diarrhea improved  He tolerated Metformin in the past  When he held metformin blood glucose worsened    Prior history  He has tried to eat healthy,  can stick to it for 1 week and after that he goes back to his regular eating habits.   \"I have heard about the diet changes 1000 times but I cannot stick to it \"  limited activity, increase hunger  Has Dexcom G6  Few episodes of hypoglycemia due to increased activity in the yard          Wt Readings from Last 3 Encounters:   01/12/21 222 lb (100.7 kg)   10/12/20 229 lb 3.2 oz (104 kg)   08/05/20 221 lb (100.2 kg)       BP Readings from Last 3 Encounters:   01/12/21 122/72   10/12/20 (!) 146/92   08/05/20 120/78           Past Medical History:   Diagnosis Date    Acute pancreatitis 7/28/2020    Anxiety     Chronic diarrhea 7/28/2020    Diabetes mellitus     Essential hypertension     Essential hypertension 7/28/2020    Generalized anxiety disorder 7/28/2020    History of arthroscopic surgery of elbow     Hyperlipidemia     Hyperlipidemia 7/28/2020    Joint pain     Pancreatitis     Ringing in the ears     Tobacco dependence due to cigarettes 7/28/2020    Uncontrolled type 2 diabetes mellitus ([facility] [address] 7/28/2020     Current Outpatient Medications   Medication Sig    budesonide-formoteroL (SYMBICORT) 160-4.5 mcg/actuation HFAA TAKE 2 PUFFS BY INHALATION TWO (2) TIMES A DAY. INDICATIONS: CONTROLLER MEDICATION FOR ASTHMA    metFORMIN (GLUCOPHAGE) 500 mg tablet TAKE 2 TABLETS TWICE DAILY    lisinopriL (PRINIVIL, ZESTRIL) 20 mg tablet TAKE 1 TABLET BY MOUTH EVERY DAY    Insulin Needles, Disposable, (Felipa Pen Needle) 32 gauge x 5/32\" ndle Inject insulins up to QID Dx Code: E11.65    albuterol (PROVENTIL HFA, VENTOLIN HFA, PROAIR HFA) 90 mcg/actuation inhaler 1-2 puffs inhalation every 4- 6hrs as needed    Vascepa 1 gram capsule TAKE 2 CAPSULES BY MOUTH TWICE A DAY    escitalopram oxalate (Lexapro) 20 mg tablet Take 1 Tab by mouth daily. Indications: anxiousness associated with depression    fenofibrate nanocrystallized (TRICOR) 145 mg tablet Take 1 Tab by mouth daily.  betamethasone dipropionate (DIPROSONE) 0.05 % topical cream Apply  to affected area two (2) times a day. Prn skin rash. Maximum of 14 days continous use.  Insulin Needles, Disposable, (BD Ultra-Fine Mini Pen Needle) 31 gauge x 3/16\" ndle by Does Not Apply route.  glucose blood VI test strips (OneTouch Verio test strips) strip by Does Not Apply route See Admin Instructions.  insulin glargine U-300 conc (Toujeo Max U-300 SoloStar) 300 unit/mL (3 mL) inpn 100 Units by SubCUTAneous route daily. Toujeo Max    insulin lispro (HUMALOG) 100 unit/mL kwikpen 15 - 30 units before meals    aspirin delayed-release 81 mg tablet Take 81 mg by mouth daily.  dulaglutide (Trulicity) 4.13 NF/0.1 mL sub-q pen 0.5 mL by SubCUTAneous route every seven (7) days.  evolocumab (Repatha SureClick) pen injection INJECT 140 MG UNDER THE SKIN EVERY 14 DAYS  Indications: high cholesterol     No current facility-administered medications for this visit. Allergies   Allergen Reactions    Levaquin [Levofloxacin] Rash and Nausea and Vomiting    Neuromuscular Blockers, Steroidal Other (comments)     Heart palpitations on steroids       Review of Systems: Per HPI    Physical Examination:   Blood pressure 122/72, pulse 93, temperature 97.3 °F (36.3 °C), temperature source Oral, resp. rate 16, height 5' 10\" (1.778 m), weight 222 lb (100.7 kg), SpO2 97 %. Estimated body mass index is 31.85 kg/m² as calculated from the following:    Height as of this encounter: 5' 10\" (1.778 m). -   Weight as of this encounter: 222 lb (100.7 kg).   - General: pleasant, no distress, good eye contact  - HEENT: no pallor, no periorbital edema, EOMI  - Neck: supple,  - Cardiovascular: regular,  normal S1 and S2  - Respiratory: clear to auscultation bilaterally  - Gastrointestinal: soft, nontender, nondistended,  BS +  - Musculoskeletal: no proximal muscle weakness in upper or lower extremities  -   - Neurological: alert and oriented  - Psychiatric: normal mood and affect  - Skin: color, texture, turgor normal.         Data Reviewed:       Lab Results   Component Value Date/Time    Hemoglobin A1c 7.2 (H) 08/27/2020 08:40 AM    Hemoglobin A1c, External 8.6 03/12/2020    Glucose 150 (H) 08/27/2020 08:36 AM    Glucose (POC) 212 (H) 07/12/2010 07:29 PM    Glucose (POC) 211 (H) 07/12/2010 03:38 PM    LDL, calculated Comment 08/27/2020 08:36 AM    Creatinine (POC) 0.6 07/12/2010 07:29 PM    Creatinine 0.85 08/27/2020 08:36 AM      Lab Results   Component Value Date/Time    GFR est non-AA 93 08/27/2020 08:36 AM    GFRNA, POC >60 07/12/2010 07:29 PM    GFR est  08/27/2020 08:36 AM    GFRAA, POC >60 07/12/2010 07:29 PM    Creatinine 0.85 08/27/2020 08:36 AM    Creatinine (POC) 0.6 07/12/2010 07:29 PM    BUN 20 08/27/2020 08:36 AM    BUN (POC) 19 07/12/2010 07:29 PM    Sodium 137 08/27/2020 08:36 AM    Sodium (POC) 130 (L) 07/12/2010 07:29 PM    Potassium 5.0 08/27/2020 08:36 AM    Potassium (POC) 3.8 07/12/2010 07:29 PM    Chloride 98 08/27/2020 08:36 AM    Chloride (POC) 103 07/12/2010 07:29 PM    CO2 20 08/27/2020 08:36 AM       Assessment/Plan:     1. Type 2 diabetes mellitus with hyperglycemia, with long-term current use of insulin ([facility] [address]    2. Essential hypertension    3. Mixed hyperlipidemia        1. Type 2 Diabetes Mellitus   Lab Results   Component Value Date/Time    Hemoglobin A1c 7.2 (H) 08/27/2020 08:40 AM    Hemoglobin A1c, External 8.6 03/12/2020   Worsened control, severe hyperglycemia  Missing medications, he is overwhelmed,  Counseled at length, agreed to take the medications  Has difficulty taking multiple injections, insulin pump as an option but expect there is an issue  Other challenge is he is requiring high-dose insulin and if he goes on insulin pump he has to change the site frequently  Weight loss is another option so he does not require multiple doses of insulin, various diet plans discussed,  Stressed the importance of lifestyle changes, behavioral modification. Toujeo, Trulicity, Humalog  Metformin extended release smaller dose    Discussed with the patient that there are no studies in patients with pancreatitis, increased risk of pancreatitis with Trulicity and he may have another attack which can cause more problems, since it is helping him he would like to continue. Pancreatitis was due to hypertriglyceridemia. He was asked to discontinue at the first sign of nausea or vomiting or abdominal pain. High risk for hypoglycemia given higher dose of insulin usage        2. HTN : Continue current therapy     3. Hyperlipidemia : Continue statin. 4.Obesity:Body mass index is 31.85 kg/m². Discussed about the importance of exercise and carbohydrate portion control.         Spent 40 minutes on the day of service preparing the chart, counseling and discussing complications, insulin pump therapy, documenting,    There are no Patient Instructions on file for this visit.      Thank you for allowing me to participate in the care of this patient. Charisma Berger MD      Patient verbalized understanding     Voice-recognition software was used to generate this report, which may result in some phonetic-based errors in the grammar and contents. Even though attempts were made to correct all the mistakes, some may have been missed and remained in the body of the report.

## 2021-01-12 NOTE — PROGRESS NOTES
Sunita Badillo is a 60 y.o. male here for   Chief Complaint   Patient presents with    Diabetes       1. Have you been to the ER, urgent care clinic since your last visit? Hospitalized since your last visit? -no    2. Have you seen or consulted any other health care providers outside of the [address] [place] [address] since your last visit?   Include any pap smears or colon screening.-no

## 2021-01-12 NOTE — LETTER
1/12/2021 Patient: Elizabeth Bear YOB: 1955 Date of Visit: 1/12/2021 Madhavi Jj MD 
[place] [id number] [facility] [address] [address] [employer] Dear Madhavi Jj MD, Thank you for referring Mr. Dalton Mustafa to [address] [address] for evaluation. My notes for this consultation are attached. If you have questions, please do not hesitate to call me. I look forward to following your patient along with you. Sincerely, Julian Cyr MD

## 2021-01-28 ENCOUNTER — IMMUNIZATION (OUTPATIENT)
Dept: FAMILY MEDICINE CLINIC | Age: 66
End: 2021-01-28
Payer: COMMERCIAL

## 2021-01-28 DIAGNOSIS — Z23 ENCOUNTER FOR IMMUNIZATION: Primary | ICD-10-CM

## 2021-01-28 PROCEDURE — 91301 COVID-19, MRNA, LNP-S, PF, 100MCG/0.5ML DOSE(MODERNA): CPT | Performed by: FAMILY MEDICINE

## 2021-02-26 ENCOUNTER — IMMUNIZATION (OUTPATIENT)
Dept: FAMILY MEDICINE CLINIC | Age: 66
End: 2021-02-26
Payer: COMMERCIAL

## 2021-02-26 DIAGNOSIS — Z23 ENCOUNTER FOR IMMUNIZATION: Primary | ICD-10-CM

## 2021-02-26 PROCEDURE — 91301 COVID-19, MRNA, LNP-S, PF, 100MCG/0.5ML DOSE(MODERNA): CPT | Performed by: FAMILY MEDICINE

## 2021-02-26 PROCEDURE — 0012A COVID-19, MRNA, LNP-S, PF, 100MCG/0.5ML DOSE(MODERNA): CPT | Performed by: FAMILY MEDICINE

## 2021-03-15 ENCOUNTER — TELEPHONE (OUTPATIENT)
Dept: FAMILY MEDICINE CLINIC | Age: 66
End: 2021-03-15

## 2021-03-18 NOTE — TELEPHONE ENCOUNTER
Patient called and LVM stating he will  his immunization information on Friday and just wanted to let me know he has not forgotten.

## 2021-03-30 ENCOUNTER — PATIENT MESSAGE (OUTPATIENT)
Dept: PRIMARY CARE CLINIC | Age: 66
End: 2021-03-30

## 2021-03-30 DIAGNOSIS — E78.5 HYPERLIPIDEMIA, UNSPECIFIED: ICD-10-CM

## 2021-03-30 DIAGNOSIS — I10 ESSENTIAL (PRIMARY) HYPERTENSION: ICD-10-CM

## 2021-03-30 RX ORDER — LISINOPRIL 20 MG/1
TABLET ORAL
Qty: 90 TAB | Refills: 0 | Status: SHIPPED | OUTPATIENT
Start: 2021-03-30

## 2021-03-30 RX ORDER — ICOSAPENT ETHYL 1000 MG/1
CAPSULE ORAL
Qty: 360 CAP | Refills: 0 | Status: SHIPPED | OUTPATIENT
Start: 2021-03-30 | End: 2021-05-12 | Stop reason: SDUPTHER

## 2021-03-30 RX ORDER — ESCITALOPRAM OXALATE 20 MG/1
20 TABLET ORAL DAILY
Qty: 90 TAB | Refills: 0 | Status: SHIPPED | OUTPATIENT
Start: 2021-03-30

## 2021-03-30 NOTE — TELEPHONE ENCOUNTER
From: Saad Jenkins  To: Ryanne Snyder MD  Sent: 3/30/2021 4:29 PM EDT  Subject: Non-Urgent Medical Question    I have recently been advised that dr snyder will be leaving the practice this week being her last week. My wife and I are long time patients of her and are very sad to hear this. Please have dr doll contact me regarding future care. She can also reach out to my wife at [phone].

## 2021-05-04 DIAGNOSIS — I25.10 CORONARY ARTERY DISEASE INVOLVING NATIVE CORONARY ARTERY OF NATIVE HEART: ICD-10-CM

## 2021-05-05 RX ORDER — EVOLOCUMAB 140 MG/ML
INJECTION, SOLUTION SUBCUTANEOUS
Qty: 6 PEN | Refills: 1 | Status: SHIPPED | OUTPATIENT
Start: 2021-05-05 | End: 2021-12-28

## 2021-05-12 DIAGNOSIS — Z79.4 TYPE 2 DIABETES MELLITUS WITH HYPERGLYCEMIA, WITH LONG-TERM CURRENT USE OF INSULIN (HCC): ICD-10-CM

## 2021-05-12 DIAGNOSIS — E11.65 TYPE 2 DIABETES MELLITUS WITH HYPERGLYCEMIA, WITH LONG-TERM CURRENT USE OF INSULIN (HCC): ICD-10-CM

## 2021-05-12 DIAGNOSIS — E78.5 HYPERLIPIDEMIA, UNSPECIFIED: ICD-10-CM

## 2021-05-12 RX ORDER — METFORMIN HYDROCHLORIDE 750 MG/1
750 TABLET, EXTENDED RELEASE ORAL 2 TIMES DAILY
Qty: 180 TAB | Refills: 3 | Status: SHIPPED | OUTPATIENT
Start: 2021-05-12

## 2021-05-13 RX ORDER — ICOSAPENT ETHYL 1000 MG/1
CAPSULE ORAL
Qty: 360 CAP | Refills: 0 | Status: SHIPPED | OUTPATIENT
Start: 2021-05-13

## 2021-10-01 ENCOUNTER — TRANSCRIBE ORDER (OUTPATIENT)
Dept: SCHEDULING | Age: 66
End: 2021-10-01

## 2021-10-01 DIAGNOSIS — Z87.891 PERSONAL HISTORY OF SMOKING: Primary | ICD-10-CM

## 2021-10-07 ENCOUNTER — HOSPITAL ENCOUNTER (OUTPATIENT)
Dept: CT IMAGING | Age: 66
Discharge: HOME | End: 2021-10-07
Attending: INTERNAL MEDICINE
Payer: COMMERCIAL

## 2021-10-07 VITALS — WEIGHT: 208 LBS | HEIGHT: 70 IN | BODY MASS INDEX: 29.78 KG/M2

## 2021-10-07 DIAGNOSIS — Z87.891 PERSONAL HISTORY OF SMOKING: ICD-10-CM

## 2021-10-07 PROCEDURE — 71271 CT THORAX LUNG CANCER SCR C-: CPT

## 2021-10-19 ENCOUNTER — TRANSCRIBE ORDER (OUTPATIENT)
Dept: SCHEDULING | Age: 66
End: 2021-10-19

## 2021-10-19 DIAGNOSIS — Z87.891 PERSONAL HISTORY OF NICOTINE DEPENDENCE: Primary | ICD-10-CM

## 2021-12-28 DIAGNOSIS — I25.10 CORONARY ARTERY DISEASE INVOLVING NATIVE CORONARY ARTERY OF NATIVE HEART: ICD-10-CM

## 2021-12-28 RX ORDER — EVOLOCUMAB 140 MG/ML
INJECTION, SOLUTION SUBCUTANEOUS
Qty: 6 PEN | Refills: 1 | Status: SHIPPED | OUTPATIENT
Start: 2021-12-28 | End: 2022-08-04

## 2022-03-18 PROBLEM — L40.8 PSORIASIFORM SEBORRHEIC DERMATITIS: Status: ACTIVE | Noted: 2020-08-21

## 2022-03-18 PROBLEM — J45.31 MILD PERSISTENT ASTHMA WITH ACUTE EXACERBATION: Status: ACTIVE | Noted: 2020-12-15

## 2022-03-18 PROBLEM — K85.90 ACUTE PANCREATITIS: Status: ACTIVE | Noted: 2020-07-28

## 2022-03-18 PROBLEM — F41.1 GENERALIZED ANXIETY DISORDER: Status: ACTIVE | Noted: 2020-07-28

## 2022-03-18 PROBLEM — K52.9 CHRONIC DIARRHEA: Status: ACTIVE | Noted: 2020-07-28

## 2022-03-19 PROBLEM — R06.09 DYSPNEA ON EXERTION: Status: ACTIVE | Noted: 2020-08-21

## 2022-03-19 PROBLEM — E11.65 TYPE 2 DIABETES MELLITUS WITH HYPERGLYCEMIA, WITH LONG-TERM CURRENT USE OF INSULIN (HCC): Status: ACTIVE | Noted: 2020-07-17

## 2022-03-19 PROBLEM — Z79.4 TYPE 2 DIABETES MELLITUS WITH HYPERGLYCEMIA, WITH LONG-TERM CURRENT USE OF INSULIN (HCC): Status: ACTIVE | Noted: 2020-07-17

## 2022-03-19 PROBLEM — E78.5 HYPERLIPIDEMIA: Status: ACTIVE | Noted: 2020-07-28

## 2022-03-19 PROBLEM — I10 ESSENTIAL HYPERTENSION: Status: ACTIVE | Noted: 2020-07-28

## 2022-03-19 PROBLEM — I25.10 CORONARY ARTERY DISEASE INVOLVING NATIVE CORONARY ARTERY OF NATIVE HEART: Status: ACTIVE | Noted: 2020-08-21

## 2022-03-19 PROBLEM — F17.210 TOBACCO DEPENDENCE DUE TO CIGARETTES: Status: ACTIVE | Noted: 2020-07-28

## 2022-08-04 ENCOUNTER — HOSPITAL ENCOUNTER (EMERGENCY)
Age: 67
Discharge: HOME | End: 2022-08-04
Attending: STUDENT IN AN ORGANIZED HEALTH CARE EDUCATION/TRAINING PROGRAM
Payer: COMMERCIAL

## 2022-08-04 VITALS
HEART RATE: 88 BPM | HEIGHT: 70 IN | WEIGHT: 212 LBS | BODY MASS INDEX: 30.35 KG/M2 | DIASTOLIC BLOOD PRESSURE: 82 MMHG | OXYGEN SATURATION: 96 % | RESPIRATION RATE: 16 BRPM | TEMPERATURE: 98.3 F | SYSTOLIC BLOOD PRESSURE: 128 MMHG

## 2022-08-04 DIAGNOSIS — H60.502 ACUTE OTITIS EXTERNA OF LEFT EAR, UNSPECIFIED TYPE: ICD-10-CM

## 2022-08-04 DIAGNOSIS — H66.002 NON-RECURRENT ACUTE SUPPURATIVE OTITIS MEDIA OF LEFT EAR WITHOUT SPONTANEOUS RUPTURE OF TYMPANIC MEMBRANE: Primary | ICD-10-CM

## 2022-08-04 PROCEDURE — 99283 EMERGENCY DEPT VISIT LOW MDM: CPT

## 2022-08-04 RX ORDER — AMOXICILLIN 875 MG/1
875 TABLET, FILM COATED ORAL 2 TIMES DAILY
Qty: 14 TABLET | Refills: 0 | Status: SHIPPED | OUTPATIENT
Start: 2022-08-04 | End: 2022-08-11

## 2022-08-04 RX ORDER — NEOMYCIN SULFATE, POLYMYXIN B SULFATE, HYDROCORTISONE 3.5; 10000; 1 MG/ML; [USP'U]/ML; MG/ML
3-4 SOLUTION/ DROPS AURICULAR (OTIC) 4 TIMES DAILY
Qty: 10 ML | Refills: 0 | Status: SHIPPED | OUTPATIENT
Start: 2022-08-04

## 2022-08-04 NOTE — ED NOTES
Pt given discharge instructions, patient education, 2 prescriptions, and follow up information. Pt verbalizes understanding. All questions answered. Pt discharged to home in private vehicle, ambulatory. Pt A/Ox4, RA, pain controlled.

## 2022-08-04 NOTE — ED TRIAGE NOTES
Pt ambulatory into ER with cc of left ear pain that began 1 week ago. Pt also reports left throat pain and left neck pain. Pt reports sinus congestion. Pt denies dental pain. Pt reports using \"hot oil and wax remover\" at home without relief.

## 2022-08-05 NOTE — ED PROVIDER NOTES
Patient is a 43-year-old male history of hypertension and diabetes presenting today secondary to left ear pain. Patient reports he has had pain for the past several days. He did swim recently. No fevers. No drainage. Has used hot oil/wax remover at home to try to treat the ear without improvement. Pain radiates down into the jaw and up into the head. No other complaints.   Right now pain is constant       Past Medical History:   Diagnosis Date    Acute pancreatitis 7/28/2020    Anxiety     Chronic diarrhea 7/28/2020    Diabetes mellitus     Essential hypertension     Essential hypertension 7/28/2020    Generalized anxiety disorder 7/28/2020    History of arthroscopic surgery of elbow     Hyperlipidemia     Hyperlipidemia 7/28/2020    Joint pain     Pancreatitis     Ringing in the ears     Tobacco dependence due to cigarettes 7/28/2020    Uncontrolled type 2 diabetes mellitus 7/28/2020       Past Surgical History:   Procedure Laterality Date    HX APPENDECTOMY      HX COLONOSCOPY      HX TONSILLECTOMY      HX TONSILLECTOMY           Family History:   Problem Relation Age of Onset    Heart Disease Father     Cancer Father         lung    Hypertension Father     Cancer Mother         colon       Social History     Socioeconomic History    Marital status:      Spouse name: Not on file    Number of children: Not on file    Years of education: Not on file    Highest education level: Not on file   Occupational History    Not on file   Tobacco Use    Smoking status: Former    Smokeless tobacco: Never   Substance and Sexual Activity    Alcohol use: Yes     Comment: socially    Drug use: Never    Sexual activity: Not on file   Other Topics Concern    Not on file   Social History Narrative    Not on file     Social Determinants of Health     Financial Resource Strain: Not on file   Food Insecurity: Not on file   Transportation Needs: Not on file   Physical Activity: Not on file   Stress: Not on file   Social Connections: Not on file   Intimate Partner Violence: Not on file   Housing Stability: Not on file         ALLERGIES: Levaquin [levofloxacin] and Neuromuscular blockers, steroidal    Review of Systems   HENT:  Positive for ear pain. Neurological:  Positive for headaches. All other systems reviewed and are negative. Vitals:    08/04/22 1452   BP: 128/82   Pulse: 88   Resp: 16   Temp: 98.3 °F (36.8 °C)   SpO2: 96%   Weight: 96.2 kg (212 lb)   Height: 5' 10\" (1.778 m)            Physical Exam  Constitutional:       General: He is not in acute distress. Appearance: He is well-developed. He is not ill-appearing. HENT:      Head: Normocephalic. Ears:      Comments: Right ear is clear  Left ear pain with manipulation of the tragus. No mastoid erythema or tenderness. There is erythema/swelling to the external auditory canal.  There appears to be a purulent effusion behind the left TM with mild erythema. No associated lymphadenopathy. Eyes:      Conjunctiva/sclera: Conjunctivae normal.   Pulmonary:      Effort: Pulmonary effort is normal. No respiratory distress. Musculoskeletal:         General: Normal range of motion. Cervical back: Normal range of motion. Skin:     General: Skin is warm and dry. Capillary Refill: Capillary refill takes less than 2 seconds. Neurological:      Mental Status: He is alert and oriented to person, place, and time. Psychiatric:         Behavior: Behavior normal.        MDM         Procedures          Patient is a well-appearing 45-year-old diabetic male here today with left ear pain. He has erythema and swelling of the external auditory canal with painful manipulation of the tragus, suspicious for otitis externa especially in the setting of recently swimming. Also has what appears to be a purulent effusion behind the left TM. I will treat with both topical and systemic antibiotics. No evidence of mastoiditis or malignant otitis.   Okay for discharge home.  Return precautions provided. ICD-10-CM ICD-9-CM    1. Non-recurrent acute suppurative otitis media of left ear without spontaneous rupture of tympanic membrane  H66.002 382.00       2.  Acute otitis externa of left ear, unspecified type  H60.502 380.10         Ever Garvin DO

## 2023-04-17 ENCOUNTER — TRANSCRIBE ORDER (OUTPATIENT)
Dept: SCHEDULING | Age: 68
End: 2023-04-17

## 2023-04-17 DIAGNOSIS — M50.30 DDD (DEGENERATIVE DISC DISEASE), CERVICAL: Primary | ICD-10-CM

## 2023-04-22 ENCOUNTER — TRANSCRIBE ORDERS (OUTPATIENT)
Facility: HOSPITAL | Age: 68
End: 2023-04-22

## 2023-04-22 DIAGNOSIS — M50.30 DDD (DEGENERATIVE DISC DISEASE), CERVICAL: Primary | ICD-10-CM

## 2023-04-24 DIAGNOSIS — M50.30 DDD (DEGENERATIVE DISC DISEASE), CERVICAL: Primary | ICD-10-CM

## 2023-05-08 ENCOUNTER — HOSPITAL ENCOUNTER (OUTPATIENT)
Facility: HOSPITAL | Age: 68
Discharge: HOME | End: 2023-05-11
Payer: COMMERCIAL

## 2023-05-08 DIAGNOSIS — M50.30 DDD (DEGENERATIVE DISC DISEASE), CERVICAL: ICD-10-CM

## 2023-05-08 PROCEDURE — 72141 MRI NECK SPINE W/O DYE: CPT

## 2024-05-20 ENCOUNTER — APPOINTMENT (OUTPATIENT)
Facility: HOSPITAL | Age: 69
End: 2024-05-20
Payer: MEDICARE

## 2024-05-20 ENCOUNTER — HOSPITAL ENCOUNTER (EMERGENCY)
Facility: HOSPITAL | Age: 69
Discharge: HOME | End: 2024-05-20
Attending: EMERGENCY MEDICINE
Payer: MEDICARE

## 2024-05-20 VITALS
OXYGEN SATURATION: 96 % | WEIGHT: 215 LBS | SYSTOLIC BLOOD PRESSURE: 131 MMHG | HEIGHT: 70 IN | RESPIRATION RATE: 14 BRPM | DIASTOLIC BLOOD PRESSURE: 83 MMHG | TEMPERATURE: 98.6 F | HEART RATE: 74 BPM | BODY MASS INDEX: 30.78 KG/M2

## 2024-05-20 DIAGNOSIS — R07.9 CHEST PAIN, UNSPECIFIED TYPE: Primary | ICD-10-CM

## 2024-05-20 DIAGNOSIS — J18.9 PNEUMONIA OF LEFT LOWER LOBE DUE TO INFECTIOUS ORGANISM: ICD-10-CM

## 2024-05-20 DIAGNOSIS — R42 DIZZINESS: ICD-10-CM

## 2024-05-20 LAB
ALBUMIN SERPL-MCNC: 4.2 G/DL (ref 3.5–5.2)
ALBUMIN/GLOB SERPL: 1.4 (ref 1.1–2.2)
ALP SERPL-CCNC: 85 U/L (ref 40–129)
ALT SERPL-CCNC: 33 U/L (ref 10–50)
ANION GAP SERPL CALC-SCNC: 15 MMOL/L (ref 5–15)
AST SERPL-CCNC: 23 U/L (ref 10–50)
BASOPHILS # BLD: 0.1 K/UL (ref 0–1)
BASOPHILS NFR BLD: 1 % (ref 0–1)
BILIRUB SERPL-MCNC: 0.3 MG/DL (ref 0.2–1)
BUN SERPL-MCNC: 18 MG/DL (ref 8–23)
BUN/CREAT SERPL: 25 (ref 12–20)
CALCIUM SERPL-MCNC: 9 MG/DL (ref 8.8–10.2)
CHLORIDE SERPL-SCNC: 100 MMOL/L (ref 98–107)
CO2 SERPL-SCNC: 21 MMOL/L (ref 22–29)
COMMENT:: NORMAL
CREAT SERPL-MCNC: 0.71 MG/DL (ref 0.7–1.2)
DIFFERENTIAL METHOD BLD: ABNORMAL
EKG ATRIAL RATE: 76 BPM
EKG DIAGNOSIS: NORMAL
EKG P AXIS: 17 DEGREES
EKG P-R INTERVAL: 192 MS
EKG Q-T INTERVAL: 368 MS
EKG QRS DURATION: 76 MS
EKG QTC CALCULATION (BAZETT): 414 MS
EKG R AXIS: -28 DEGREES
EKG T AXIS: 5 DEGREES
EKG VENTRICULAR RATE: 76 BPM
EOSINOPHIL # BLD: 0.3 K/UL (ref 0–0.4)
EOSINOPHIL NFR BLD: 4 % (ref 0–7)
ERYTHROCYTE [DISTWIDTH] IN BLOOD BY AUTOMATED COUNT: 13.9 % (ref 11.5–14.5)
GLOBULIN SER CALC-MCNC: 3.1 G/DL (ref 2–4)
GLUCOSE SERPL-MCNC: 195 MG/DL (ref 65–100)
HCT VFR BLD AUTO: 47.3 % (ref 36.6–50.3)
HGB BLD-MCNC: 16 G/DL (ref 12.1–17)
IMM GRANULOCYTES # BLD AUTO: 0.1 K/UL (ref 0–0.04)
IMM GRANULOCYTES NFR BLD AUTO: 1 % (ref 0–0.5)
LYMPHOCYTES # BLD: 2.6 K/UL (ref 0.8–3.5)
LYMPHOCYTES NFR BLD: 34 % (ref 12–49)
MCH RBC QN AUTO: 28 PG (ref 26–34)
MCHC RBC AUTO-ENTMCNC: 33.8 G/DL (ref 30–36.5)
MCV RBC AUTO: 82.8 FL (ref 80–99)
MONOCYTES # BLD: 0.6 K/UL (ref 0–1)
MONOCYTES NFR BLD: 8 % (ref 5–13)
NEUTS SEG # BLD: 4.1 K/UL (ref 1.8–8)
NEUTS SEG NFR BLD: 52 % (ref 32–75)
NRBC # BLD: 0 K/UL (ref 0–0.01)
NRBC BLD-RTO: 0 PER 100 WBC
PLATELET # BLD AUTO: 216 K/UL (ref 150–400)
PMV BLD AUTO: 10.4 FL (ref 8.9–12.9)
POTASSIUM SERPL-SCNC: 4.4 MMOL/L (ref 3.5–5.1)
PROT SERPL-MCNC: 7.3 G/DL (ref 6.4–8.3)
RBC # BLD AUTO: 5.71 M/UL (ref 4.1–5.7)
SODIUM SERPL-SCNC: 136 MMOL/L (ref 136–145)
SPECIMEN HOLD: NORMAL
TROPONIN T SERPL HS-MCNC: 12 NG/L (ref 0–22)
TROPONIN T SERPL HS-MCNC: 9.3 NG/L (ref 0–22)
WBC # BLD AUTO: 7.7 K/UL (ref 4.1–11.1)

## 2024-05-20 PROCEDURE — 36415 COLL VENOUS BLD VENIPUNCTURE: CPT

## 2024-05-20 PROCEDURE — 6370000000 HC RX 637 (ALT 250 FOR IP): Performed by: EMERGENCY MEDICINE

## 2024-05-20 PROCEDURE — 99285 EMERGENCY DEPT VISIT HI MDM: CPT

## 2024-05-20 PROCEDURE — 70450 CT HEAD/BRAIN W/O DYE: CPT

## 2024-05-20 PROCEDURE — 71045 X-RAY EXAM CHEST 1 VIEW: CPT

## 2024-05-20 PROCEDURE — 85025 COMPLETE CBC W/AUTO DIFF WBC: CPT

## 2024-05-20 PROCEDURE — 93005 ELECTROCARDIOGRAM TRACING: CPT | Performed by: EMERGENCY MEDICINE

## 2024-05-20 PROCEDURE — 2580000003 HC RX 258: Performed by: EMERGENCY MEDICINE

## 2024-05-20 PROCEDURE — 96374 THER/PROPH/DIAG INJ IV PUSH: CPT

## 2024-05-20 PROCEDURE — 84484 ASSAY OF TROPONIN QUANT: CPT

## 2024-05-20 PROCEDURE — 93010 ELECTROCARDIOGRAM REPORT: CPT | Performed by: INTERNAL MEDICINE

## 2024-05-20 PROCEDURE — 80053 COMPREHEN METABOLIC PANEL: CPT

## 2024-05-20 PROCEDURE — 6360000002 HC RX W HCPCS: Performed by: EMERGENCY MEDICINE

## 2024-05-20 RX ORDER — AZITHROMYCIN 250 MG/1
500 TABLET, FILM COATED ORAL
Status: COMPLETED | OUTPATIENT
Start: 2024-05-20 | End: 2024-05-20

## 2024-05-20 RX ORDER — ALBUTEROL SULFATE 90 UG/1
2 AEROSOL, METERED RESPIRATORY (INHALATION) 4 TIMES DAILY PRN
Qty: 18 G | Refills: 5 | Status: SHIPPED | OUTPATIENT
Start: 2024-05-20

## 2024-05-20 RX ORDER — AZITHROMYCIN 250 MG/1
TABLET, FILM COATED ORAL
Qty: 6 TABLET | Refills: 0 | Status: SHIPPED | OUTPATIENT
Start: 2024-05-20 | End: 2024-05-30

## 2024-05-20 RX ADMIN — WATER 1000 MG: 1 INJECTION INTRAMUSCULAR; INTRAVENOUS; SUBCUTANEOUS at 11:22

## 2024-05-20 RX ADMIN — AZITHROMYCIN DIHYDRATE 500 MG: 250 TABLET ORAL at 11:22

## 2024-05-20 ASSESSMENT — ENCOUNTER SYMPTOMS
RHINORRHEA: 0
BACK PAIN: 0
SHORTNESS OF BREATH: 0
COUGH: 0
COLOR CHANGE: 0
DIARRHEA: 0
TROUBLE SWALLOWING: 0
NAUSEA: 0
ABDOMINAL PAIN: 0
ORTHOPNEA: 0
VOMITING: 0
SORE THROAT: 0
HEARTBURN: 0
EYE PAIN: 0

## 2024-05-20 ASSESSMENT — PAIN SCALES - GENERAL
PAINLEVEL_OUTOF10: 0
PAINLEVEL_OUTOF10: 0

## 2024-05-20 NOTE — ED TRIAGE NOTES
Pt arrives to ER with wife POV c/o chest pain, dizziness, off balance since Saturday morning. Pt had telehealth appt this morning and provider notcie some slurred speech and was sent to ER for further evaluation. Reports he thoughts and reaction time have been slow.

## 2024-05-20 NOTE — ED PROVIDER NOTES
[facility] [place] EMERGENCY DEPT  EMERGENCY DEPARTMENT ENCOUNTER      Pt Name: Sameer Chen  MRN: [id number]  Birthdate 1955  Date of evaluation: 5/20/2024  Provider: Boris Burgess MD    CHIEF COMPLAINT       Chief Complaint   Patient presents with    Chest Pain    Dizziness     Hard to get thought across         HISTORY OF PRESENT ILLNESS   (Location/Symptom, Timing/Onset, Context/Setting, Quality, Duration, Modifying Factors, Severity)  Note limiting factors.   68-year-old male with chest pain and dizziness for 3 days.  States on Friday he started having some dizziness and chest tightness.  And thought he may have had some trouble with his speech.  He did not have dysarthria I just felt that he was kind of slow and \"foggy\" since then he is doing very well.  No motor deficits of arms or legs.  No sensory deficits.  He was sent by his primary physician for evaluation of his chest pain and dizziness.  No history of coronary artery disease.  No history of arrhythmias.  No history of stroke or hypercoagulable state.    The history is provided by the patient.   Chest Pain  Pain location:  Substernal area  Pain quality: aching and dull    Pain radiates to:  Does not radiate  Pain severity:  Mild  Onset quality:  Gradual  Timing:  Constant  Progression:  Worsening  Chronicity:  New  Context: not breathing, not drug use, not eating and not lifting    Relieved by:  Nothing  Worsened by:  Nothing  Ineffective treatments:  None tried  Associated symptoms: dizziness    Associated symptoms: no abdominal pain, no anorexia, no back pain, no cough, no dysphagia, no fatigue, no fever, no headache, no heartburn, no lower extremity edema, no nausea, no numbness, no orthopnea, no PND, no shortness of breath, no syncope and no vomiting          Review of External Medical Records:     Nursing Notes were reviewed.    REVIEW OF SYSTEMS    (2-9 systems for level 4, 10 or more for level 5)     Review of Systems   Constitutional:  Negative